# Patient Record
(demographics unavailable — no encounter records)

---

## 2025-03-17 NOTE — PROCEDURE
[Medium Joint Injection] : medium joint injection [Right] : of the right [Elbow Joint] : elbow joint [Alcohol] : alcohol [Betadine] : betadine [Ethyl Chloride sprayed topically] : ethyl chloride sprayed topically [Sterile technique used] : sterile technique used [Other: ___] : [unfilled] [] : Patient tolerated procedure well [Call if redness, pain or fever occur] : call if redness, pain or fever occur [Apply ice for 15min out of every hour for the next 12-24 hours as tolerated] : apply ice for 15 minutes out of every hour for the next 12-24 hours as tolerated [Patient was advised to rest the joint(s) for ____ days] : patient was advised to rest the joint(s) for [unfilled] days [Risks, benefits, alternatives discussed / Verbal consent obtained] : the risks benefits, and alternatives have been discussed, and verbal consent was obtained [de-identified] : olecranon bursitis [de-identified] : 5-6 cc [de-identified] : clear, browning / straw colored

## 2025-03-27 NOTE — PHYSICAL EXAM
[de-identified] : Physical exam of the right elbow: She has some mild swelling of the olecranon bursa.  No erythema, warmth, drainage, or pus.  Full range of motion of the elbow.

## 2025-03-27 NOTE — DISCUSSION/SUMMARY
[de-identified] : Today and discharge technique with the patient's consent, I aspirated 3 cc of combination of serosanguineous and bloody fluid.  She tolerated this well.  The puncture site was covered with a Band-Aid.  She was instructed to wear an elbow compression sleeve 24 hours a day and 7 days a week for the next several weeks.  She will finish off the antibiotics.  She will avoid leaning on her elbow.  She will follow-up if she has continued swelling in few weeks.  All questions were answered today.

## 2025-03-27 NOTE — HISTORY OF PRESENT ILLNESS
[de-identified] :  patient is a 45-year-old female here for repeat evaluation of her right elbow.  On Sunday it got worse and she contact the office and was placed on antibiotic.   It has gone down since then but is still filled up with fluid.